# Patient Record
Sex: FEMALE | Race: WHITE | NOT HISPANIC OR LATINO | URBAN - METROPOLITAN AREA
[De-identification: names, ages, dates, MRNs, and addresses within clinical notes are randomized per-mention and may not be internally consistent; named-entity substitution may affect disease eponyms.]

---

## 2018-01-15 ENCOUNTER — OUTPATIENT (OUTPATIENT)
Dept: OUTPATIENT SERVICES | Facility: HOSPITAL | Age: 68
LOS: 1 days | End: 2018-01-15
Payer: COMMERCIAL

## 2018-01-15 PROCEDURE — 73630 X-RAY EXAM OF FOOT: CPT | Mod: 26,RT

## 2018-01-15 PROCEDURE — 73630 X-RAY EXAM OF FOOT: CPT

## 2019-03-28 ENCOUNTER — APPOINTMENT (OUTPATIENT)
Dept: ORTHOPEDIC SURGERY | Facility: CLINIC | Age: 69
End: 2019-03-28
Payer: COMMERCIAL

## 2019-03-28 VITALS — HEIGHT: 61 IN | RESPIRATION RATE: 16 BRPM | BODY MASS INDEX: 25.49 KG/M2 | WEIGHT: 135 LBS

## 2019-03-28 DIAGNOSIS — Z87.891 PERSONAL HISTORY OF NICOTINE DEPENDENCE: ICD-10-CM

## 2019-03-28 DIAGNOSIS — Z86.79 PERSONAL HISTORY OF OTHER DISEASES OF THE CIRCULATORY SYSTEM: ICD-10-CM

## 2019-03-28 DIAGNOSIS — M66.241 SPONTANEOUS RUPTURE OF EXTENSOR TENDONS, RIGHT HAND: ICD-10-CM

## 2019-03-28 DIAGNOSIS — Z85.3 PERSONAL HISTORY OF MALIGNANT NEOPLASM OF BREAST: ICD-10-CM

## 2019-03-28 PROCEDURE — 99203 OFFICE O/P NEW LOW 30 MIN: CPT

## 2019-03-28 PROCEDURE — 73130 X-RAY EXAM OF HAND: CPT | Mod: RT

## 2019-06-11 ENCOUNTER — OUTPATIENT (OUTPATIENT)
Dept: OUTPATIENT SERVICES | Facility: HOSPITAL | Age: 69
LOS: 1 days | End: 2019-06-11
Payer: COMMERCIAL

## 2019-06-11 PROCEDURE — 73630 X-RAY EXAM OF FOOT: CPT | Mod: 26,RT

## 2019-06-11 PROCEDURE — 73630 X-RAY EXAM OF FOOT: CPT

## 2021-02-22 ENCOUNTER — OUTPATIENT (OUTPATIENT)
Dept: OUTPATIENT SERVICES | Facility: HOSPITAL | Age: 71
LOS: 1 days | End: 2021-02-22
Payer: MEDICARE

## 2021-02-22 PROCEDURE — 73564 X-RAY EXAM KNEE 4 OR MORE: CPT | Mod: 26,LT

## 2021-02-22 PROCEDURE — 73564 X-RAY EXAM KNEE 4 OR MORE: CPT

## 2022-02-14 ENCOUNTER — OUTPATIENT (OUTPATIENT)
Dept: OUTPATIENT SERVICES | Facility: HOSPITAL | Age: 72
LOS: 1 days | End: 2022-02-14
Payer: MEDICARE

## 2022-02-14 PROCEDURE — 73564 X-RAY EXAM KNEE 4 OR MORE: CPT | Mod: 26,50

## 2022-02-14 PROCEDURE — 73564 X-RAY EXAM KNEE 4 OR MORE: CPT

## 2022-06-13 ENCOUNTER — RESULT REVIEW (OUTPATIENT)
Age: 72
End: 2022-06-13

## 2023-03-13 ENCOUNTER — OUTPATIENT (OUTPATIENT)
Dept: OUTPATIENT SERVICES | Facility: HOSPITAL | Age: 73
LOS: 1 days | End: 2023-03-13
Payer: MEDICARE

## 2023-03-13 PROCEDURE — 73564 X-RAY EXAM KNEE 4 OR MORE: CPT | Mod: 26,50

## 2023-03-13 PROCEDURE — 73564 X-RAY EXAM KNEE 4 OR MORE: CPT

## 2024-06-17 ENCOUNTER — NON-APPOINTMENT (OUTPATIENT)
Age: 74
End: 2024-06-17

## 2024-06-20 ENCOUNTER — APPOINTMENT (OUTPATIENT)
Dept: ORTHOPEDIC SURGERY | Facility: CLINIC | Age: 74
End: 2024-06-20
Payer: MEDICARE

## 2024-06-20 VITALS — BODY MASS INDEX: 26.5 KG/M2 | HEIGHT: 60 IN | WEIGHT: 135 LBS

## 2024-06-20 DIAGNOSIS — Z78.9 OTHER SPECIFIED HEALTH STATUS: ICD-10-CM

## 2024-06-20 DIAGNOSIS — Z87.39 PERSONAL HISTORY OF OTHER DISEASES OF THE MUSCULOSKELETAL SYSTEM AND CONNECTIVE TISSUE: ICD-10-CM

## 2024-06-20 DIAGNOSIS — Z86.69 PERSONAL HISTORY OF OTHER DISEASES OF THE NERVOUS SYSTEM AND SENSE ORGANS: ICD-10-CM

## 2024-06-20 DIAGNOSIS — G20.A1 PARKINSON'S DISEASE WITHOUT DYSKINESIA, WITHOUT MENTION OF FLUCTUATIONS: ICD-10-CM

## 2024-06-20 DIAGNOSIS — G89.29 PAIN IN LEFT KNEE: ICD-10-CM

## 2024-06-20 DIAGNOSIS — Z86.39 PERSONAL HISTORY OF OTHER ENDOCRINE, NUTRITIONAL AND METABOLIC DISEASE: ICD-10-CM

## 2024-06-20 DIAGNOSIS — S20.212A CONTUSION OF LEFT FRONT WALL OF THORAX, INITIAL ENCOUNTER: ICD-10-CM

## 2024-06-20 DIAGNOSIS — M25.562 PAIN IN LEFT KNEE: ICD-10-CM

## 2024-06-20 DIAGNOSIS — M17.12 UNILATERAL PRIMARY OSTEOARTHRITIS, LEFT KNEE: ICD-10-CM

## 2024-06-20 DIAGNOSIS — Z85.3 PERSONAL HISTORY OF MALIGNANT NEOPLASM OF BREAST: ICD-10-CM

## 2024-06-20 PROCEDURE — 20610 DRAIN/INJ JOINT/BURSA W/O US: CPT | Mod: 59,LT

## 2024-06-20 PROCEDURE — 73564 X-RAY EXAM KNEE 4 OR MORE: CPT | Mod: LT

## 2024-06-20 PROCEDURE — 99203 OFFICE O/P NEW LOW 30 MIN: CPT | Mod: 25

## 2024-06-20 PROCEDURE — 71100 X-RAY EXAM RIBS UNI 2 VIEWS: CPT | Mod: LT

## 2024-06-20 RX ORDER — GABAPENTIN 100 MG/1
CAPSULE ORAL
Refills: 0 | Status: ACTIVE | COMMUNITY

## 2024-06-20 RX ORDER — ANASTROZOLE TABLETS 1 MG/1
1 TABLET ORAL
Refills: 0 | Status: ACTIVE | COMMUNITY

## 2024-06-20 RX ORDER — LEVOTHYROXINE SODIUM 200 MCG
VIAL (EA) INTRAVENOUS
Refills: 0 | Status: ACTIVE | COMMUNITY

## 2024-06-20 RX ORDER — BIOTIN 5 MG
1000 TABLET ORAL
Refills: 0 | Status: ACTIVE | COMMUNITY

## 2024-06-20 RX ORDER — LORAZEPAM 2 MG/1
TABLET ORAL
Refills: 0 | Status: ACTIVE | COMMUNITY

## 2024-06-20 RX ORDER — METOPROLOL TARTRATE 75 MG/1
TABLET, FILM COATED ORAL
Refills: 0 | Status: ACTIVE | COMMUNITY

## 2024-06-20 RX ORDER — ONDANSETRON 4 MG/1
4 TABLET ORAL
Refills: 0 | Status: ACTIVE | COMMUNITY

## 2024-06-20 RX ORDER — ATORVASTATIN CALCIUM 80 MG/1
TABLET, FILM COATED ORAL
Refills: 0 | Status: ACTIVE | COMMUNITY

## 2024-06-20 RX ORDER — CARBIDOPA AND LEVODOPA 25; 250 MG/1; MG/1
TABLET ORAL
Refills: 0 | Status: ACTIVE | COMMUNITY

## 2024-06-20 RX ORDER — CHROMIUM 200 MCG
TABLET ORAL
Refills: 0 | Status: ACTIVE | COMMUNITY

## 2024-06-20 NOTE — HISTORY OF PRESENT ILLNESS
[de-identified] :  Ms. Patten is a 73 year old female who comes in for evaluation for LEFT knee pain that started in December. She had fallen a few times at home and remembers one of the times banging her LEFT knee. She has history of knee pain over the years but never anything severe. She had just been diagnosed with Parkinson's around the time she fell. She uses a cane or walker due to Parkinson's and back pain. She has had 2 spinal surgeries.  Pain is variable. Her knee has felt swollen and has been cracking. She has pain walking and sitting. She can walk about 2 blocks. She takes Tylenol as needed. She cannot take NSAIDs.  She had seen a doctor at [facility] when she fell and had x-rays and was told there no fractures.  She does not think any injections have been done.  She is doing physical therapy for other issues  She also had fallen about 5 months ago or so and had seen someone because her ribs hurt.  She did not have imaging because she was told that there was nothing that could be done if she had a fracture.  Overall the pain is better except when she lies on her right side and takes a deep breath

## 2024-06-20 NOTE — PHYSICAL EXAM
[LE] : Sensory: Intact in bilateral lower extremities [Normal RLE] : Right Lower Extremity: No scars, rashes, lesions, ulcers, skin intact [Normal LLE] : Left Lower Extremity: No scars, rashes, lesions, ulcers, skin intact [Normal Touch] : sensation intact for touch [Normal] : Oriented to person, place, and time, insight and judgement were intact and the affect was normal [de-identified] : Knees She walks with a limp and cautious gait needing some assistance except for a very short distance. No edema, ecchymoses, erythema, deformity. Left knee range of motion is 0 to 125 degrees with mild pain on full flexion. Mild tenderness medial joint line anterior lateral joint line.  No other tenderness. Ia Lachman.  Negative anterior posterior drawer.  Normal varus valgus laxity. Negative Shabbir. Normal neurovascular exam [de-identified] :  X-rays ordered, performed and reviewed today of left knee weightbearing 4 views showed mild narrowing medial joint space and lateral joint space and tiny osteophytes consistent with KL 2 arthritis.  Left rib x-rays ordered today to evaluate for fracture 2 views were unremarkable without any visible fracture

## 2024-06-20 NOTE — REVIEW OF SYSTEMS
[Joint Pain] : joint pain [Joint Swelling] : joint swelling [Anxiety] : anxiety [Muscle Weakness] : muscle weakness [Easy Bruising] : a tendency for easy bruising

## 2024-06-20 NOTE — ASSESSMENT
[FreeTextEntry1] : 74 y/o female with left knee pain ongoing for 6 months.  She has mild arthritis.  Steroid injection was done today to see if this alleviates pain.  She was referred to physical therapy to work on strengthening and conditioning.  If pain is ongoing then we may want to get an MRI to evaluate further because her arthritis really is not that bad. Hyaluronic acid injections may also be considered. She also had some left-sided rib pain following an injury 5 months ago or so.  She may have had a fracture but I do not see someone on x-ray today.  I would just recommend symptomatic treatment since it is relatively minimal and intermittent her pain. She will follow-up as needed after she gives the injection time to work and tries that therapy

## 2024-06-20 NOTE — PROCEDURE
[Injection] : Injection [Left] : of the left [Knee Joint] : knee joint [Osteoarthritis] : Osteoarthritis [Joint Pain] : Joint pain [Patient] : patient [Risk] : risk [Benefits] : benefits [Alternatives] : alternatives [Bleeding] : bleeding [Infection] : infection [Allergic Reaction] : allergic reaction [Verbal Consent Obtained] : verbal consent was obtained prior to the procedure [Alcohol] : Alcohol [Ethyl Chloride Spray] : ethyl chloride spray was used as a topical anesthetic [1% Lidocaine___(mL)] : [unfilled] mL of 1% Lidocaine [Triamcinolone 40mg/mL___(mL)] : [unfilled] ~UmL of 40mg/mL triamcinolone [Bandage Applied] : a bandage [Tolerated Well] : The patient tolerated the procedure well [None] : none [No Strenuous Activity___day(s)] : avoid strenuous activity for [unfilled] day(s) [PRN] : as needed [Lateral] : lateral [Anterior] : anterior [22] : a 22-gauge [FreeTextEntry1] : chloraprep

## 2025-03-03 ENCOUNTER — APPOINTMENT (OUTPATIENT)
Dept: ORTHOPEDIC SURGERY | Facility: CLINIC | Age: 75
End: 2025-03-03
Payer: MEDICARE

## 2025-03-03 DIAGNOSIS — M25.562 PAIN IN LEFT KNEE: ICD-10-CM

## 2025-03-03 DIAGNOSIS — G89.29 PAIN IN LEFT KNEE: ICD-10-CM

## 2025-03-03 DIAGNOSIS — M17.12 UNILATERAL PRIMARY OSTEOARTHRITIS, LEFT KNEE: ICD-10-CM

## 2025-03-03 DIAGNOSIS — R60.0 LOCALIZED EDEMA: ICD-10-CM

## 2025-03-03 PROCEDURE — 20610 DRAIN/INJ JOINT/BURSA W/O US: CPT | Mod: LT

## 2025-03-03 PROCEDURE — 99213 OFFICE O/P EST LOW 20 MIN: CPT | Mod: 25

## 2025-03-03 PROCEDURE — 73564 X-RAY EXAM KNEE 4 OR MORE: CPT | Mod: 59,LT

## 2025-03-21 ENCOUNTER — APPOINTMENT (OUTPATIENT)
Dept: ORTHOPEDIC SURGERY | Facility: CLINIC | Age: 75
End: 2025-03-21
Payer: MEDICARE

## 2025-03-21 ENCOUNTER — NON-APPOINTMENT (OUTPATIENT)
Age: 75
End: 2025-03-21

## 2025-03-21 VITALS
HEART RATE: 65 BPM | DIASTOLIC BLOOD PRESSURE: 76 MMHG | OXYGEN SATURATION: 97 % | BODY MASS INDEX: 24.94 KG/M2 | HEIGHT: 60 IN | TEMPERATURE: 97.8 F | SYSTOLIC BLOOD PRESSURE: 111 MMHG | WEIGHT: 127 LBS

## 2025-03-21 DIAGNOSIS — M70.61 TROCHANTERIC BURSITIS, RIGHT HIP: ICD-10-CM

## 2025-03-21 DIAGNOSIS — M54.50 LOW BACK PAIN, UNSPECIFIED: ICD-10-CM

## 2025-03-21 PROCEDURE — 99215 OFFICE O/P EST HI 40 MIN: CPT

## 2025-03-21 PROCEDURE — 72110 X-RAY EXAM L-2 SPINE 4/>VWS: CPT

## 2025-05-06 ENCOUNTER — APPOINTMENT (OUTPATIENT)
Dept: ORTHOPEDIC SURGERY | Facility: CLINIC | Age: 75
End: 2025-05-06
Payer: MEDICARE

## 2025-05-06 DIAGNOSIS — M20.11 HALLUX VALGUS (ACQUIRED), RIGHT FOOT: ICD-10-CM

## 2025-05-06 DIAGNOSIS — M71.22 SYNOVIAL CYST OF POPLITEAL SPACE [BAKER], LEFT KNEE: ICD-10-CM

## 2025-05-06 DIAGNOSIS — M25.571 PAIN IN RIGHT ANKLE AND JOINTS OF RIGHT FOOT: ICD-10-CM

## 2025-05-06 DIAGNOSIS — M20.21 HALLUX RIGIDUS, RIGHT FOOT: ICD-10-CM

## 2025-05-06 PROCEDURE — 73564 X-RAY EXAM KNEE 4 OR MORE: CPT | Mod: LT

## 2025-05-06 PROCEDURE — 73630 X-RAY EXAM OF FOOT: CPT | Mod: RT

## 2025-05-06 PROCEDURE — 73610 X-RAY EXAM OF ANKLE: CPT | Mod: RT

## 2025-05-06 PROCEDURE — 20610 DRAIN/INJ JOINT/BURSA W/O US: CPT | Mod: 59,LT

## 2025-05-06 PROCEDURE — 99214 OFFICE O/P EST MOD 30 MIN: CPT | Mod: 25

## 2025-05-13 ENCOUNTER — APPOINTMENT (OUTPATIENT)
Dept: ORTHOPEDIC SURGERY | Facility: CLINIC | Age: 75
End: 2025-05-13
Payer: MEDICARE

## 2025-05-13 DIAGNOSIS — M25.562 PAIN IN LEFT KNEE: ICD-10-CM

## 2025-05-13 DIAGNOSIS — M17.12 UNILATERAL PRIMARY OSTEOARTHRITIS, LEFT KNEE: ICD-10-CM

## 2025-05-13 DIAGNOSIS — G89.29 PAIN IN LEFT KNEE: ICD-10-CM

## 2025-05-13 PROCEDURE — 99214 OFFICE O/P EST MOD 30 MIN: CPT

## 2025-05-16 ENCOUNTER — TRANSCRIPTION ENCOUNTER (OUTPATIENT)
Age: 75
End: 2025-05-16

## 2025-05-16 ENCOUNTER — APPOINTMENT (OUTPATIENT)
Dept: ORTHOPEDIC SURGERY | Facility: CLINIC | Age: 75
End: 2025-05-16

## 2025-05-21 ENCOUNTER — NON-APPOINTMENT (OUTPATIENT)
Age: 75
End: 2025-05-21

## 2025-05-28 ENCOUNTER — APPOINTMENT (OUTPATIENT)
Dept: ORTHOPEDIC SURGERY | Facility: CLINIC | Age: 75
End: 2025-05-28
Payer: MEDICARE

## 2025-05-28 DIAGNOSIS — M17.12 UNILATERAL PRIMARY OSTEOARTHRITIS, LEFT KNEE: ICD-10-CM

## 2025-05-28 DIAGNOSIS — M25.462 EFFUSION, LEFT KNEE: ICD-10-CM

## 2025-05-28 DIAGNOSIS — M71.22 SYNOVIAL CYST OF POPLITEAL SPACE [BAKER], LEFT KNEE: ICD-10-CM

## 2025-05-28 LAB
SYCRY CLARITY: NORMAL
SYCRY COLOR: YELLOW
SYCRY ID: NORMAL
SYCRY TUBE: NORMAL

## 2025-05-28 PROCEDURE — 20610 DRAIN/INJ JOINT/BURSA W/O US: CPT | Mod: 59,LT

## 2025-05-28 PROCEDURE — 99213 OFFICE O/P EST LOW 20 MIN: CPT | Mod: 25

## 2025-06-02 ENCOUNTER — NON-APPOINTMENT (OUTPATIENT)
Age: 75
End: 2025-06-02

## 2025-06-12 ENCOUNTER — APPOINTMENT (OUTPATIENT)
Dept: ORTHOPEDIC SURGERY | Facility: CLINIC | Age: 75
End: 2025-06-12

## 2025-06-19 ENCOUNTER — APPOINTMENT (OUTPATIENT)
Dept: ORTHOPEDIC SURGERY | Facility: CLINIC | Age: 75
End: 2025-06-19
Payer: MEDICARE

## 2025-06-19 PROCEDURE — 20610 DRAIN/INJ JOINT/BURSA W/O US: CPT | Mod: 59,LT

## 2025-06-19 PROCEDURE — 99213 OFFICE O/P EST LOW 20 MIN: CPT | Mod: 25

## 2025-07-02 ENCOUNTER — APPOINTMENT (OUTPATIENT)
Dept: ORTHOPEDIC SURGERY | Facility: AMBULATORY SURGERY CENTER | Age: 75
End: 2025-07-02
Payer: MEDICARE

## 2025-07-02 PROCEDURE — 99213 OFFICE O/P EST LOW 20 MIN: CPT | Mod: 25

## 2025-07-02 PROCEDURE — 20610 DRAIN/INJ JOINT/BURSA W/O US: CPT | Mod: LT,59

## 2025-07-16 ENCOUNTER — NON-APPOINTMENT (OUTPATIENT)
Age: 75
End: 2025-07-16

## 2025-07-18 ENCOUNTER — APPOINTMENT (OUTPATIENT)
Dept: ORTHOPEDIC SURGERY | Facility: CLINIC | Age: 75
End: 2025-07-18
Payer: MEDICARE

## 2025-07-18 PROBLEM — Z86.69 HISTORY OF PARKINSON'S DISEASE: Status: RESOLVED | Noted: 2024-06-20 | Resolved: 2025-07-18

## 2025-07-18 PROBLEM — M25.561 CHRONIC PAIN OF RIGHT KNEE: Status: ACTIVE | Noted: 2025-07-18

## 2025-07-18 PROCEDURE — 99213 OFFICE O/P EST LOW 20 MIN: CPT

## 2025-07-18 PROCEDURE — 73552 X-RAY EXAM OF FEMUR 2/>: CPT | Mod: RT

## 2025-07-21 ENCOUNTER — OUTPATIENT (OUTPATIENT)
Dept: OUTPATIENT SERVICES | Facility: HOSPITAL | Age: 75
LOS: 1 days | End: 2025-07-21
Payer: MEDICARE

## 2025-07-21 ENCOUNTER — APPOINTMENT (OUTPATIENT)
Dept: RADIOLOGY | Facility: CLINIC | Age: 75
End: 2025-07-21

## 2025-07-21 ENCOUNTER — APPOINTMENT (OUTPATIENT)
Dept: ORTHOPEDIC SURGERY | Facility: CLINIC | Age: 75
End: 2025-07-21
Payer: MEDICARE

## 2025-07-21 DIAGNOSIS — G89.29 PAIN IN LEFT KNEE: ICD-10-CM

## 2025-07-21 DIAGNOSIS — M25.562 PAIN IN LEFT KNEE: ICD-10-CM

## 2025-07-21 DIAGNOSIS — M17.12 UNILATERAL PRIMARY OSTEOARTHRITIS, LEFT KNEE: ICD-10-CM

## 2025-07-21 PROCEDURE — 73564 X-RAY EXAM KNEE 4 OR MORE: CPT | Mod: 26,50,XE

## 2025-07-21 PROCEDURE — 77073 BONE LENGTH STUDIES: CPT | Mod: 26

## 2025-07-21 PROCEDURE — 73564 X-RAY EXAM KNEE 4 OR MORE: CPT

## 2025-07-21 PROCEDURE — 99214 OFFICE O/P EST MOD 30 MIN: CPT

## 2025-07-21 PROCEDURE — 77073 BONE LENGTH STUDIES: CPT

## 2025-07-25 PROBLEM — M17.12 PRIMARY OSTEOARTHRITIS OF LEFT KNEE: Status: ACTIVE | Noted: 2025-07-17
